# Patient Record
Sex: MALE | URBAN - METROPOLITAN AREA
[De-identification: names, ages, dates, MRNs, and addresses within clinical notes are randomized per-mention and may not be internally consistent; named-entity substitution may affect disease eponyms.]

---

## 2023-05-30 ENCOUNTER — OFFICE VISIT (OUTPATIENT)
Dept: ENT CLINIC | Age: 28
End: 2023-05-30
Payer: COMMERCIAL

## 2023-05-30 VITALS
SYSTOLIC BLOOD PRESSURE: 113 MMHG | HEART RATE: 80 BPM | BODY MASS INDEX: 35 KG/M2 | DIASTOLIC BLOOD PRESSURE: 79 MMHG | WEIGHT: 250 LBS | HEIGHT: 71 IN

## 2023-05-30 DIAGNOSIS — R13.12 OROPHARYNGEAL DYSPHAGIA: Primary | ICD-10-CM

## 2023-05-30 DIAGNOSIS — R22.1 MASS OF RIGHT SIDE OF NECK: ICD-10-CM

## 2023-05-30 DIAGNOSIS — R22.1 NECK MASS: ICD-10-CM

## 2023-05-30 PROCEDURE — 99204 OFFICE O/P NEW MOD 45 MIN: CPT | Performed by: OTOLARYNGOLOGY

## 2023-06-05 LAB
Lab: NORMAL
REPORT: NORMAL
THIS TEST SENT TO: NORMAL

## 2023-07-18 ENCOUNTER — TELEPHONE (OUTPATIENT)
Dept: ENT CLINIC | Age: 28
End: 2023-07-18

## 2023-07-18 NOTE — TELEPHONE ENCOUNTER
[facility]ed [facility] at [facility] due to patient did not show for his appt. Darlene states he was put on the schedule for the appt but no one scheduled transportation.     Will call back to r/s appt

## 2023-07-19 ENCOUNTER — PREP FOR PROCEDURE (OUTPATIENT)
Dept: ENT CLINIC | Age: 28
End: 2023-07-19

## 2023-07-19 ENCOUNTER — TELEPHONE (OUTPATIENT)
Dept: ENT CLINIC | Age: 28
End: 2023-07-19

## 2023-07-19 PROBLEM — Q89.2 THYROGLOSSAL DUCT CYST: Status: ACTIVE | Noted: 2023-07-19

## 2023-07-19 NOTE — TELEPHONE ENCOUNTER
Prior Authorization Form:      DEMOGRAPHICS:                     Patient Name:  Kerri Molina  Patient :  1995            Insurance:  Payor: Braeden Cano / Plan: [employer] / Product Type: Indemnity /   Insurance ID Number:    Payer/Plan Subscr  Sex Relation Sub. Ins. ID Effective Group Num   1.  Braeden Molina 1995 Male Self [id number]                                    [address] [facility], [address]         DIAGNOSIS & PROCEDURE:                       Procedure/Operation: EXCISION OF THYROGLOSSAL DUCT CYST           CPT Code: 35407    Diagnosis:  THYROGLOSSAL DUCT CYST    ICD10 Code: Q89.2    Location:  [facility]    Surgeon:  DR Corina Gutierrez INFORMATION:                          Date: 23    Time: N/A              Anesthesia:  General                                                       Status:  Outpatient        Special Comments:  [facility]   PATHOLOGY       Electronically signed by Devora Waldrop MA on 2023 at 1:45 PM

## 2023-08-23 NOTE — PROGRESS NOTES
[address]   PRE-ADMISSION TESTING GENERAL INSTRUCTIONS  [facility] Phone Number: [phone]      GENERAL INSTRUCTIONS:    [x] Antibacterial Soap Shower Night before and/or AM of surgery. [x] Do not wear lotions, powders, deodorant. [x] Nothing by mouth after midnight; including gum, candy, mints, or water. [x] You may brush your teeth, gargle, but do NOT swallow water. [x] No tobacco products, illegal drugs, or alcohol within 24 hours of your surgery. [x] Jewelry or valuables should not be brought to the hospital. All body and/or tongue piercing's must be removed prior to arriving to hospital. No contact lens or hair pins. [x] Arrange transportation with a responsible adult  to and from the hospital. Arrange for someone to be with you for the remainder of the day and for 24 hours after your procedure due to having had anesthesia. [x] Bring insurance card and photo ID. PARKING INSTRUCTIONS:     [x] ARRIVAL DATE & TIME:  8/28  @  0530    [x] Times are subject to change. We will contact you the business day before surgery if that were to occur. [x] Enter into the [employer]. Two people may accompany you. Masks are not required. EDUCATION INSTRUCTIONS:        [x] Pain: Post-op pain is normal and to be expected. You will be asked to rate your pain from 0-10. MEDICATION INSTRUCTIONS:    [x] Bring a complete list of your medications, please write the last time you took the medicine, give this list to the nurse in Pre-Op. [x] Take only the following medications the morning of surgery with 1-2 ounces of water: NONE  [x] Stop all herbal supplements and vitamins 5 days before surgery. Stop NSAIDS 7 days before surgery. [x] Follow physician instructions regarding any blood thinners you may be taking.     WHAT TO EXPECT:    [x] The day of surgery you will be greeted and checked in by the [employer].  In addition, you will be registered in the

## 2023-08-25 ENCOUNTER — ANESTHESIA EVENT (OUTPATIENT)
Dept: OPERATING ROOM | Age: 28
End: 2023-08-25

## 2023-08-28 ENCOUNTER — ANESTHESIA (OUTPATIENT)
Dept: OPERATING ROOM | Age: 28
End: 2023-08-28

## 2023-08-28 ENCOUNTER — HOSPITAL ENCOUNTER (OUTPATIENT)
Age: 28
Setting detail: OUTPATIENT SURGERY
Discharge: OTHER INSTITUTION | End: 2023-08-28
Attending: OTOLARYNGOLOGY | Admitting: OTOLARYNGOLOGY

## 2023-08-28 VITALS
BODY MASS INDEX: 35.28 KG/M2 | WEIGHT: 252 LBS | HEART RATE: 68 BPM | TEMPERATURE: 97.2 F | RESPIRATION RATE: 15 BRPM | SYSTOLIC BLOOD PRESSURE: 135 MMHG | HEIGHT: 71 IN | OXYGEN SATURATION: 97 % | DIASTOLIC BLOOD PRESSURE: 95 MMHG

## 2023-08-28 DIAGNOSIS — Q89.2 THYROGLOSSAL DUCT CYST: ICD-10-CM

## 2023-08-28 PROCEDURE — 2709999900 HC NON-CHARGEABLE SUPPLY: Performed by: OTOLARYNGOLOGY

## 2023-08-28 PROCEDURE — 7100000001 HC PACU RECOVERY - ADDTL 15 MIN: Performed by: OTOLARYNGOLOGY

## 2023-08-28 PROCEDURE — 3700000001 HC ADD 15 MINUTES (ANESTHESIA): Performed by: OTOLARYNGOLOGY

## 2023-08-28 PROCEDURE — 7100000000 HC PACU RECOVERY - FIRST 15 MIN: Performed by: OTOLARYNGOLOGY

## 2023-08-28 PROCEDURE — 2720000010 HC SURG SUPPLY STERILE: Performed by: OTOLARYNGOLOGY

## 2023-08-28 PROCEDURE — 88311 DECALCIFY TISSUE: CPT

## 2023-08-28 PROCEDURE — 3600000016 HC SURGERY LEVEL 6 ADDTL 15MIN: Performed by: OTOLARYNGOLOGY

## 2023-08-28 PROCEDURE — 88307 TISSUE EXAM BY PATHOLOGIST: CPT

## 2023-08-28 PROCEDURE — 2500000003 HC RX 250 WO HCPCS

## 2023-08-28 PROCEDURE — C9399 UNCLASSIFIED DRUGS OR BIOLOG: HCPCS

## 2023-08-28 PROCEDURE — 6360000002 HC RX W HCPCS

## 2023-08-28 PROCEDURE — 2580000003 HC RX 258

## 2023-08-28 PROCEDURE — 7100000010 HC PHASE II RECOVERY - FIRST 15 MIN: Performed by: OTOLARYNGOLOGY

## 2023-08-28 PROCEDURE — 60280 REMOVE THYROID DUCT LESION: CPT | Performed by: OTOLARYNGOLOGY

## 2023-08-28 PROCEDURE — 3600000006 HC SURGERY LEVEL 6 BASE: Performed by: OTOLARYNGOLOGY

## 2023-08-28 PROCEDURE — 2500000003 HC RX 250 WO HCPCS: Performed by: OTOLARYNGOLOGY

## 2023-08-28 PROCEDURE — 7100000011 HC PHASE II RECOVERY - ADDTL 15 MIN: Performed by: OTOLARYNGOLOGY

## 2023-08-28 PROCEDURE — 3700000000 HC ANESTHESIA ATTENDED CARE: Performed by: OTOLARYNGOLOGY

## 2023-08-28 RX ORDER — SODIUM CHLORIDE 0.9 % (FLUSH) 0.9 %
5-40 SYRINGE (ML) INJECTION EVERY 12 HOURS SCHEDULED
Status: DISCONTINUED | OUTPATIENT
Start: 2023-08-28 | End: 2023-08-28 | Stop reason: HOSPADM

## 2023-08-28 RX ORDER — ROCURONIUM BROMIDE 10 MG/ML
INJECTION, SOLUTION INTRAVENOUS PRN
Status: DISCONTINUED | OUTPATIENT
Start: 2023-08-28 | End: 2023-08-28 | Stop reason: SDUPTHER

## 2023-08-28 RX ORDER — SODIUM CHLORIDE 9 MG/ML
INJECTION, SOLUTION INTRAVENOUS PRN
Status: DISCONTINUED | OUTPATIENT
Start: 2023-08-28 | End: 2023-08-28 | Stop reason: HOSPADM

## 2023-08-28 RX ORDER — LIDOCAINE HYDROCHLORIDE 20 MG/ML
INJECTION, SOLUTION INTRAVENOUS PRN
Status: DISCONTINUED | OUTPATIENT
Start: 2023-08-28 | End: 2023-08-28 | Stop reason: SDUPTHER

## 2023-08-28 RX ORDER — DEXAMETHASONE SODIUM PHOSPHATE 10 MG/ML
INJECTION INTRAMUSCULAR; INTRAVENOUS PRN
Status: DISCONTINUED | OUTPATIENT
Start: 2023-08-28 | End: 2023-08-28 | Stop reason: SDUPTHER

## 2023-08-28 RX ORDER — ONDANSETRON 2 MG/ML
INJECTION INTRAMUSCULAR; INTRAVENOUS PRN
Status: DISCONTINUED | OUTPATIENT
Start: 2023-08-28 | End: 2023-08-28 | Stop reason: SDUPTHER

## 2023-08-28 RX ORDER — HYDROMORPHONE HYDROCHLORIDE 1 MG/ML
0.5 INJECTION, SOLUTION INTRAMUSCULAR; INTRAVENOUS; SUBCUTANEOUS EVERY 5 MIN PRN
Status: DISCONTINUED | OUTPATIENT
Start: 2023-08-28 | End: 2023-08-28 | Stop reason: HOSPADM

## 2023-08-28 RX ORDER — CEPHALEXIN 500 MG/1
500 CAPSULE ORAL 3 TIMES DAILY
Qty: 21 CAPSULE | Refills: 0 | Status: SHIPPED | OUTPATIENT
Start: 2023-08-28 | End: 2023-09-04

## 2023-08-28 RX ORDER — LIDOCAINE HYDROCHLORIDE AND EPINEPHRINE 10; 10 MG/ML; UG/ML
INJECTION, SOLUTION INFILTRATION; PERINEURAL PRN
Status: DISCONTINUED | OUTPATIENT
Start: 2023-08-28 | End: 2023-08-28 | Stop reason: ALTCHOICE

## 2023-08-28 RX ORDER — SODIUM CHLORIDE 0.9 % (FLUSH) 0.9 %
5-40 SYRINGE (ML) INJECTION PRN
Status: DISCONTINUED | OUTPATIENT
Start: 2023-08-28 | End: 2023-08-28 | Stop reason: HOSPADM

## 2023-08-28 RX ORDER — MIDAZOLAM HYDROCHLORIDE 1 MG/ML
INJECTION INTRAMUSCULAR; INTRAVENOUS PRN
Status: DISCONTINUED | OUTPATIENT
Start: 2023-08-28 | End: 2023-08-28 | Stop reason: SDUPTHER

## 2023-08-28 RX ORDER — SODIUM CHLORIDE 9 MG/ML
INJECTION, SOLUTION INTRAVENOUS CONTINUOUS PRN
Status: DISCONTINUED | OUTPATIENT
Start: 2023-08-28 | End: 2023-08-28 | Stop reason: SDUPTHER

## 2023-08-28 RX ORDER — PHENYLEPHRINE HCL IN 0.9% NACL 1 MG/10 ML
SYRINGE (ML) INTRAVENOUS PRN
Status: DISCONTINUED | OUTPATIENT
Start: 2023-08-28 | End: 2023-08-28 | Stop reason: SDUPTHER

## 2023-08-28 RX ORDER — SODIUM CHLORIDE, SODIUM LACTATE, POTASSIUM CHLORIDE, CALCIUM CHLORIDE 600; 310; 30; 20 MG/100ML; MG/100ML; MG/100ML; MG/100ML
INJECTION, SOLUTION INTRAVENOUS CONTINUOUS
Status: DISCONTINUED | OUTPATIENT
Start: 2023-08-28 | End: 2023-08-28 | Stop reason: HOSPADM

## 2023-08-28 RX ORDER — MEPERIDINE HYDROCHLORIDE 25 MG/ML
12.5 INJECTION INTRAMUSCULAR; INTRAVENOUS; SUBCUTANEOUS EVERY 5 MIN PRN
Status: DISCONTINUED | OUTPATIENT
Start: 2023-08-28 | End: 2023-08-28 | Stop reason: HOSPADM

## 2023-08-28 RX ORDER — FENTANYL CITRATE 50 UG/ML
INJECTION, SOLUTION INTRAMUSCULAR; INTRAVENOUS PRN
Status: DISCONTINUED | OUTPATIENT
Start: 2023-08-28 | End: 2023-08-28 | Stop reason: SDUPTHER

## 2023-08-28 RX ORDER — ONDANSETRON 4 MG/1
4 TABLET, ORALLY DISINTEGRATING ORAL EVERY 8 HOURS PRN
Qty: 21 TABLET | Refills: 0 | Status: SHIPPED | OUTPATIENT
Start: 2023-08-28

## 2023-08-28 RX ORDER — CEFAZOLIN SODIUM 1 G/3ML
INJECTION, POWDER, FOR SOLUTION INTRAMUSCULAR; INTRAVENOUS PRN
Status: DISCONTINUED | OUTPATIENT
Start: 2023-08-28 | End: 2023-08-28 | Stop reason: SDUPTHER

## 2023-08-28 RX ORDER — PROPOFOL 10 MG/ML
INJECTION, EMULSION INTRAVENOUS PRN
Status: DISCONTINUED | OUTPATIENT
Start: 2023-08-28 | End: 2023-08-28 | Stop reason: SDUPTHER

## 2023-08-28 RX ADMIN — SODIUM CHLORIDE: 9 INJECTION, SOLUTION INTRAVENOUS at 07:24

## 2023-08-28 RX ADMIN — SODIUM CHLORIDE: 9 INJECTION, SOLUTION INTRAVENOUS at 08:21

## 2023-08-28 RX ADMIN — FENTANYL CITRATE 50 MCG: 0.05 INJECTION, SOLUTION INTRAMUSCULAR; INTRAVENOUS at 10:20

## 2023-08-28 RX ADMIN — LIDOCAINE HYDROCHLORIDE 100 MG: 20 INJECTION, SOLUTION INTRAVENOUS at 07:32

## 2023-08-28 RX ADMIN — MIDAZOLAM 2 MG: 1 INJECTION INTRAMUSCULAR; INTRAVENOUS at 07:24

## 2023-08-28 RX ADMIN — SODIUM CHLORIDE: 9 INJECTION, SOLUTION INTRAVENOUS at 10:01

## 2023-08-28 RX ADMIN — Medication 50 MCG: at 08:58

## 2023-08-28 RX ADMIN — FENTANYL CITRATE 50 MCG: 0.05 INJECTION, SOLUTION INTRAMUSCULAR; INTRAVENOUS at 07:32

## 2023-08-28 RX ADMIN — FENTANYL CITRATE 50 MCG: 0.05 INJECTION, SOLUTION INTRAMUSCULAR; INTRAVENOUS at 07:37

## 2023-08-28 RX ADMIN — ROCURONIUM BROMIDE 50 MG: 10 INJECTION, SOLUTION INTRAVENOUS at 07:31

## 2023-08-28 RX ADMIN — ROCURONIUM BROMIDE 10 MG: 10 INJECTION, SOLUTION INTRAVENOUS at 08:38

## 2023-08-28 RX ADMIN — FENTANYL CITRATE 100 MCG: 0.05 INJECTION, SOLUTION INTRAMUSCULAR; INTRAVENOUS at 07:30

## 2023-08-28 RX ADMIN — FENTANYL CITRATE 50 MCG: 0.05 INJECTION, SOLUTION INTRAMUSCULAR; INTRAVENOUS at 09:05

## 2023-08-28 RX ADMIN — ONDANSETRON 4 MG: 2 INJECTION INTRAMUSCULAR; INTRAVENOUS at 09:59

## 2023-08-28 RX ADMIN — FENTANYL CITRATE 50 MCG: 0.05 INJECTION, SOLUTION INTRAMUSCULAR; INTRAVENOUS at 07:50

## 2023-08-28 RX ADMIN — PROPOFOL 200 MG: 10 INJECTION, EMULSION INTRAVENOUS at 07:30

## 2023-08-28 RX ADMIN — SUGAMMADEX 200 MG: 100 INJECTION, SOLUTION INTRAVENOUS at 08:58

## 2023-08-28 RX ADMIN — CEFAZOLIN 2 G: 1 INJECTION, POWDER, FOR SOLUTION INTRAMUSCULAR; INTRAVENOUS at 07:37

## 2023-08-28 RX ADMIN — DEXAMETHASONE SODIUM PHOSPHATE 10 MG: 10 INJECTION INTRAMUSCULAR; INTRAVENOUS at 07:35

## 2023-08-28 ASSESSMENT — PAIN - FUNCTIONAL ASSESSMENT: PAIN_FUNCTIONAL_ASSESSMENT: NONE - DENIES PAIN

## 2023-08-28 ASSESSMENT — PAIN SCALES - GENERAL
PAINLEVEL_OUTOF10: 0

## 2023-08-28 NOTE — ANESTHESIA POSTPROCEDURE EVALUATION
Department of Anesthesiology  Postprocedure Note    Patient: Mabel Liu  MRN: [id number]  YOB: 1995  Date of evaluation: 8/28/2023      Procedure Summary     Date: 08/28/23 Room / Location: [facility] OR 04 / [facility]    Anesthesia Start: 1624 Anesthesia Stop:     Procedure: THYROGLOSSAL DUCT CYST EXCISION (Neck) Diagnosis:       Thyroglossal duct cyst      (Thyroglossal duct cyst [Q89.2])    Surgeons: Cici Marshall MD Responsible Provider: Gisselle Alexis MD    Anesthesia Type: general ASA Status: 2          Anesthesia Type: No value filed.     Sue Phase I: Sue Score: 8    Sue Phase II:        Anesthesia Post Evaluation    Patient location during evaluation: PACU  Patient participation: complete - patient participated  Level of consciousness: awake  Pain score: 3  Airway patency: patent  Nausea & Vomiting: no nausea and no vomiting  Complications: no  Cardiovascular status: blood pressure returned to baseline  Respiratory status: acceptable  Hydration status: euvolemic

## 2023-08-28 NOTE — H&P
Otolaryngology  History and Physical    Dear Dr Ellis primary care provider on file. No ref. provider found      We had the pleasure of seeing Ramila Horne, 1995 here    on 5/30/2023  Please see below for review of care and plans. Chief complaint- No diagnosis found. History of Present Illness- 5/30/23 New Pt here for right cystic neck mass. No pain. Some difficulty swallowing. No change in voice. Drinks a gallon of water, 3 cups of coffee, and no alcohol. Weight is stable. Review of Systems- No drainage, discharge, or headache. Complete 10 system ROS completed and negative except as noted above. Physical Examination-   Vital Signs-There were no vitals taken for this visit. Ears- Tympanic membranes clear bilaterally. No middle ear effusion. No pre or post auricular tenderness. Nose- Nasal mucosa clear and dry. No significant septal deviation or inferior turbinate hypertrophy. Oral Cavity/Oropharynx- Floor of mouth and tongue are soft and nontender. No posterior pharyngeal erythema. + gag reflex  Neck- Soft and nontender. No masses, lesions, lymphadenopathy, or thyroid nodules appreciated other than a fluctuant mass in mid neck above thyroid cartilage just off midline on right going up to hyoid. Cranial Nerve- Cranial nerves II to XII intact. Extraocular muscles intact. No gross motor visual deficits. No spontaneous nystagmus. Face- No facial skin tenderness to palpation. Heart- No cyanosis, regular  Lungs- No stridor, no intercostal accessory muscle use  General- The patient is in no acute distress. A&O x3     enttbfna   Procedure- fna-   Procedure in Detail: The patient was consented for a fna for the indication of a mass. no local was injected into area. 25 gauge needle was used to make multiple stab passes into lesion. A second specimen was collected. each one was sprayed onto a slide, with a rinse out in cytolyte.  Slides fixed and sent for permanent pathollogical

## 2023-08-28 NOTE — OP NOTE
Operative Note      Patient: Kerri Molina  YOB: 1995  MRN: [id number]    Date of Procedure: 8/28/2023    Pre-Op Diagnosis Codes: * Thyroglossal duct cyst [Q89.2]    Post-Op Diagnosis: Same       Procedure(s):  THYROGLOSSAL DUCT CYST EXCISION    Surgeon(s):  Radha Green MD    Assistant:   Resident: Sydnie Gillespie DO    Anesthesia: General    Estimated Blood Loss (mL): less than 50     Complications: None    Specimens:   ID Type Source Tests Collected by Time Destination   A : MIDLINE NECK CONTENTS TO INCLUDE HYOID BONE, CYST, PYRAMIDAL LOBE, ISTHMUS, AND PORTION OF RIGHT THYROID LOBE. Tissue Tissue SURGICAL PATHOLOGY Radha Green MD 8/28/2023 [id number]        Implants:  * No implants in log *      Drains: * No LDAs found *    Findings: large scarred in inflammed right neck cyst with extension into the thyroid gland on the right. Detailed Description of Procedure:   thryoglossal duct cyst excision Procedure Note   Indications: This patient presents with a midline mass That moves with swallowing. Procedure Details   The patient was seen in the Holding Room. The risks, benefits, complications, treatment   options, and expected outcomes were discussed with the patient. The possibilities of   reaction to medication, pulmonary aspiration, perforation of viscus, bleeding, recurrent   infection, finding a normal thyroid, recurrently laryngeal nerve damage, nerve damage, the need for   additional procedures, failure to diagnose a condition, and creating a complication   requiring transfusion or operation were discussed with the patient. The patient concurred   with the proposed plan, giving informed consent. The site of surgery properly   noted/marked. The patient was taken to Operating Room, identified as Kerri Molina  and   the procedure verified as  thyroglossal duct cyst excision with bone resection. A Time Out was held and the above   information confirmed.    The neck was extended and a

## 2023-08-28 NOTE — H&P
Mabel Liu was seen and re-examined preoperatively today, August 28, 2023. There was no substantial change in his physical and medical status. Patient is fit for the proposed surgical procedure. All questions were appropriately addressed and had no further questions regarding the risks, benefits, and alternatives of the procedure. Mabel Liu and family wished to proceed.     Orlando Henning DO  Resident Physician  [facility]  Otolaryngology Residency  8/28/2023  7:23 AM

## 2023-08-31 LAB — SURGICAL PATHOLOGY REPORT: NORMAL

## 2023-09-05 ENCOUNTER — OFFICE VISIT (OUTPATIENT)
Dept: ENT CLINIC | Age: 28
End: 2023-09-05

## 2023-09-05 VITALS
HEIGHT: 71 IN | WEIGHT: 252 LBS | BODY MASS INDEX: 35.28 KG/M2 | HEART RATE: 94 BPM | SYSTOLIC BLOOD PRESSURE: 115 MMHG | DIASTOLIC BLOOD PRESSURE: 80 MMHG

## 2023-09-05 DIAGNOSIS — R22.1 MASS OF RIGHT SIDE OF NECK: Primary | ICD-10-CM

## 2023-09-05 PROCEDURE — 99024 POSTOP FOLLOW-UP VISIT: CPT | Performed by: OTOLARYNGOLOGY

## 2023-09-05 NOTE — PROGRESS NOTES
Dear Dr Ellis primary care provider on file. No ref. provider found     We had the pleasure of seeing Kerri Molina, 1995 here    on 9/5/2023  Please see below for review of care and plans. Chief complaint- No diagnosis found. 8/28/23 - THYROGLOSSAL DUCT CYST EXCISION      History of Present Illness- 5/30/23 New Pt here for right cystic neck mass. No pain. Some difficulty swallowing. No change in voice. Drinks a gallon of water, 3 cups of coffee, and no alcohol. Weight is stable. 9/5/23: Pt here for post op thyroglossal cyst removal, completed 8/28/23. No pain. No difficulty swallowing. No change in voice. Drinks a gallon of water, 3 cups of coffee, and no alcohol. Weight is stable. Review of Systems- No drainage, discharge, or headache. Complete 10 system ROS completed and negative except as noted above. Physical Examination-   Vital Signs-There were no vitals taken for this visit. Ears- Tympanic membranes clear bilaterally. No middle ear effusion. No pre or post auricular tenderness. Nose- Nasal mucosa clear and dry. No significant septal deviation or inferior turbinate hypertrophy. Oral Cavity/Oropharynx- Floor of mouth and tongue are soft and nontender. No posterior pharyngeal erythema. + gag reflex  Neck- Soft and nontender. No masses, lesions, lymphadenopathy, or thyroid nodules appreciated other than a fluctuant mass in mid neck above thyroid cartilage just off midline on right going up to hyoid. Postop- well healed incision. Cranial Nerve- Cranial nerves II to XII intact. Extraocular muscles intact. No gross motor visual deficits. No spontaneous nystagmus. Face- No facial skin tenderness to palpation. Heart- No cyanosis, regular  Lungs- No stridor, no intercostal accessory muscle use  General- The patient is in no acute distress. A&O x3    enttbfna   Procedure- fna-   Procedure in Detail: The patient was consented for a fna for the indication of a mass.  no local

## (undated) DEVICE — KIT SURG W7XL11IN 2 PKT UNTREATED NA

## (undated) DEVICE — INSTRUMENT POUCH: Brand: DEROYAL

## (undated) DEVICE — HEAD AND NECK: Brand: MEDLINE INDUSTRIES, INC.

## (undated) DEVICE — MICRODISSECTION NEEDLE STRAIGHT SLEEVE: Brand: COLORADO

## (undated) DEVICE — STRIP SKIN CLOSURE 5X1 IN REINF N WOVEN WHT STERI STRP

## (undated) DEVICE — Device

## (undated) DEVICE — MARKER SURG SKIN FULL SZ PUR TRAD INK REGULAR ULTRA FN TWIN

## (undated) DEVICE — SYSTEM SURG HEMSTAT PWD 1 GM POLYSACCHARIDE HEMOSPHERES

## (undated) DEVICE — BAG PRSS INFUS IV OR ART 3000 CC W STPCOCK NO THUMBWHEEL W

## (undated) DEVICE — GLOVE ORANGE PI 7   MSG9070

## (undated) DEVICE — SPONGE,PEANUT,XRAY,ST,SM,3/8",5/CARD: Brand: MEDLINE INDUSTRIES, INC.

## (undated) DEVICE — INTENDED FOR TISSUE SEPARATION, AND OTHER PROCEDURES THAT REQUIRE A SHARP SURGICAL BLADE TO PUNCTURE OR CUT.: Brand: BARD-PARKER ® STAINLESS STEEL BLADES

## (undated) DEVICE — PREMIUM WET SKIN PREP TRAY: Brand: MEDLINE INDUSTRIES, INC.

## (undated) DEVICE — HOOK RETRCT 12MM S STL BLNT E STAY LONE STAR

## (undated) DEVICE — GLOVE ORANGE PI 7 1/2   MSG9075